# Patient Record
Sex: FEMALE | Race: WHITE | Employment: FULL TIME | URBAN - METROPOLITAN AREA
[De-identification: names, ages, dates, MRNs, and addresses within clinical notes are randomized per-mention and may not be internally consistent; named-entity substitution may affect disease eponyms.]

---

## 2019-04-05 ENCOUNTER — APPOINTMENT (OUTPATIENT)
Dept: CT IMAGING | Age: 24
DRG: 153 | End: 2019-04-05
Payer: COMMERCIAL

## 2019-04-05 ENCOUNTER — HOSPITAL ENCOUNTER (INPATIENT)
Age: 24
LOS: 1 days | Discharge: HOME | DRG: 153 | End: 2019-04-06
Attending: EMERGENCY MEDICINE | Admitting: INTERNAL MEDICINE
Payer: COMMERCIAL

## 2019-04-05 ENCOUNTER — APPOINTMENT (OUTPATIENT)
Dept: GENERAL RADIOLOGY | Age: 24
End: 2019-04-05
Payer: COMMERCIAL

## 2019-04-05 DIAGNOSIS — J36 TONSILLAR ABSCESS: Primary | ICD-10-CM

## 2019-04-05 LAB
ABSOLUTE BANDS #: 0.36 K/UL (ref 0–1)
ABSOLUTE EOS #: 0 K/UL (ref 0–0.4)
ABSOLUTE IMMATURE GRANULOCYTE: ABNORMAL K/UL (ref 0–0.3)
ABSOLUTE LYMPH #: 3.03 K/UL (ref 1–4.8)
ABSOLUTE MONO #: 0.73 K/UL (ref 0.1–1.3)
ALBUMIN SERPL-MCNC: 4.3 G/DL (ref 3.5–5.2)
ALBUMIN/GLOBULIN RATIO: ABNORMAL (ref 1–2.5)
ALP BLD-CCNC: 101 U/L (ref 35–104)
ALT SERPL-CCNC: 22 U/L (ref 5–33)
ANION GAP SERPL CALCULATED.3IONS-SCNC: 18 MMOL/L (ref 9–17)
AST SERPL-CCNC: 26 U/L
ATYPICAL LYMPHOCYTE ABSOLUTE COUNT: 3.87 K/UL
ATYPICAL LYMPHOCYTES: 32 %
BANDS: 3 % (ref 0–10)
BASOPHILS # BLD: 2 % (ref 0–2)
BASOPHILS ABSOLUTE: 0.24 K/UL (ref 0–0.2)
BILIRUB SERPL-MCNC: 0.39 MG/DL (ref 0.3–1.2)
BUN BLDV-MCNC: 6 MG/DL (ref 6–20)
BUN/CREAT BLD: ABNORMAL (ref 9–20)
CALCIUM SERPL-MCNC: 9.2 MG/DL (ref 8.6–10.4)
CHLORIDE BLD-SCNC: 99 MMOL/L (ref 98–107)
CO2: 21 MMOL/L (ref 20–31)
CREAT SERPL-MCNC: 0.54 MG/DL (ref 0.5–0.9)
DIFFERENTIAL TYPE: ABNORMAL
DIRECT EXAM: NORMAL
EOSINOPHILS RELATIVE PERCENT: 0 % (ref 0–4)
GFR AFRICAN AMERICAN: >60 ML/MIN
GFR NON-AFRICAN AMERICAN: >60 ML/MIN
GFR SERPL CREATININE-BSD FRML MDRD: ABNORMAL ML/MIN/{1.73_M2}
GFR SERPL CREATININE-BSD FRML MDRD: ABNORMAL ML/MIN/{1.73_M2}
GLUCOSE BLD-MCNC: 106 MG/DL (ref 70–99)
HCT VFR BLD CALC: 43.7 % (ref 36–46)
HEMOGLOBIN: 15.1 G/DL (ref 12–16)
IMMATURE GRANULOCYTES: ABNORMAL %
INR BLD: 1
LACTIC ACID, SEPSIS WHOLE BLOOD: NORMAL MMOL/L (ref 0.5–1.9)
LACTIC ACID, SEPSIS: 0.9 MMOL/L (ref 0.5–1.9)
LYMPHOCYTES # BLD: 25 % (ref 24–44)
Lab: NORMAL
MCH RBC QN AUTO: 29.1 PG (ref 26–34)
MCHC RBC AUTO-ENTMCNC: 34.5 G/DL (ref 31–37)
MCV RBC AUTO: 84.4 FL (ref 80–100)
MONOCYTES # BLD: 6 % (ref 1–7)
MORPHOLOGY: NORMAL
NRBC AUTOMATED: ABNORMAL PER 100 WBC
PARTIAL THROMBOPLASTIN TIME: 36 SEC (ref 24–36)
PDW BLD-RTO: 13.4 % (ref 11.5–14.9)
PLATELET # BLD: 192 K/UL (ref 150–450)
PLATELET ESTIMATE: ABNORMAL
PMV BLD AUTO: 7.5 FL (ref 6–12)
POTASSIUM SERPL-SCNC: 4 MMOL/L (ref 3.7–5.3)
PROTHROMBIN TIME: 13.5 SEC (ref 11.8–14.6)
RBC # BLD: 5.18 M/UL (ref 4–5.2)
RBC # BLD: ABNORMAL 10*6/UL
SEG NEUTROPHILS: 32 % (ref 36–66)
SEGMENTED NEUTROPHILS ABSOLUTE COUNT: 3.87 K/UL (ref 1.3–9.1)
SODIUM BLD-SCNC: 138 MMOL/L (ref 135–144)
SPECIMEN DESCRIPTION: NORMAL
TOTAL PROTEIN: 7.6 G/DL (ref 6.4–8.3)
WBC # BLD: 12.1 K/UL (ref 3.5–11)
WBC # BLD: ABNORMAL 10*3/UL

## 2019-04-05 PROCEDURE — 87086 URINE CULTURE/COLONY COUNT: CPT

## 2019-04-05 PROCEDURE — 6360000002 HC RX W HCPCS: Performed by: STUDENT IN AN ORGANIZED HEALTH CARE EDUCATION/TRAINING PROGRAM

## 2019-04-05 PROCEDURE — 2580000003 HC RX 258: Performed by: STUDENT IN AN ORGANIZED HEALTH CARE EDUCATION/TRAINING PROGRAM

## 2019-04-05 PROCEDURE — 99285 EMERGENCY DEPT VISIT HI MDM: CPT

## 2019-04-05 PROCEDURE — 85730 THROMBOPLASTIN TIME PARTIAL: CPT

## 2019-04-05 PROCEDURE — 1200000000 HC SEMI PRIVATE

## 2019-04-05 PROCEDURE — 70491 CT SOFT TISSUE NECK W/DYE: CPT

## 2019-04-05 PROCEDURE — 87040 BLOOD CULTURE FOR BACTERIA: CPT

## 2019-04-05 PROCEDURE — 85610 PROTHROMBIN TIME: CPT

## 2019-04-05 PROCEDURE — 71045 X-RAY EXAM CHEST 1 VIEW: CPT

## 2019-04-05 PROCEDURE — 81001 URINALYSIS AUTO W/SCOPE: CPT

## 2019-04-05 PROCEDURE — 36415 COLL VENOUS BLD VENIPUNCTURE: CPT

## 2019-04-05 PROCEDURE — 85025 COMPLETE CBC W/AUTO DIFF WBC: CPT

## 2019-04-05 PROCEDURE — 6370000000 HC RX 637 (ALT 250 FOR IP): Performed by: STUDENT IN AN ORGANIZED HEALTH CARE EDUCATION/TRAINING PROGRAM

## 2019-04-05 PROCEDURE — 87880 STREP A ASSAY W/OPTIC: CPT

## 2019-04-05 PROCEDURE — 80053 COMPREHEN METABOLIC PANEL: CPT

## 2019-04-05 PROCEDURE — 6360000004 HC RX CONTRAST MEDICATION: Performed by: STUDENT IN AN ORGANIZED HEALTH CARE EDUCATION/TRAINING PROGRAM

## 2019-04-05 PROCEDURE — 83605 ASSAY OF LACTIC ACID: CPT

## 2019-04-05 PROCEDURE — 96365 THER/PROPH/DIAG IV INF INIT: CPT

## 2019-04-05 PROCEDURE — 96375 TX/PRO/DX INJ NEW DRUG ADDON: CPT

## 2019-04-05 RX ORDER — MORPHINE SULFATE 4 MG/ML
4 INJECTION, SOLUTION INTRAMUSCULAR; INTRAVENOUS ONCE
Status: COMPLETED | OUTPATIENT
Start: 2019-04-05 | End: 2019-04-05

## 2019-04-05 RX ORDER — 0.9 % SODIUM CHLORIDE 0.9 %
30 INTRAVENOUS SOLUTION INTRAVENOUS ONCE
Status: COMPLETED | OUTPATIENT
Start: 2019-04-05 | End: 2019-04-05

## 2019-04-05 RX ORDER — 0.9 % SODIUM CHLORIDE 0.9 %
80 INTRAVENOUS SOLUTION INTRAVENOUS ONCE
Status: COMPLETED | OUTPATIENT
Start: 2019-04-05 | End: 2019-04-05

## 2019-04-05 RX ORDER — AMOXICILLIN AND CLAVULANATE POTASSIUM 875; 125 MG/1; MG/1
1 TABLET, FILM COATED ORAL 2 TIMES DAILY
Status: ON HOLD | COMMUNITY
End: 2019-04-06 | Stop reason: HOSPADM

## 2019-04-05 RX ORDER — ONDANSETRON 2 MG/ML
4 INJECTION INTRAMUSCULAR; INTRAVENOUS ONCE
Status: COMPLETED | OUTPATIENT
Start: 2019-04-05 | End: 2019-04-05

## 2019-04-05 RX ORDER — SERTRALINE HYDROCHLORIDE 100 MG/1
200 TABLET, FILM COATED ORAL DAILY
COMMUNITY

## 2019-04-05 RX ORDER — ONDANSETRON 2 MG/ML
4 INJECTION INTRAMUSCULAR; INTRAVENOUS ONCE
Status: COMPLETED | OUTPATIENT
Start: 2019-04-06 | End: 2019-04-06

## 2019-04-05 RX ORDER — SODIUM CHLORIDE 0.9 % (FLUSH) 0.9 %
10 SYRINGE (ML) INJECTION PRN
Status: DISCONTINUED | OUTPATIENT
Start: 2019-04-05 | End: 2019-04-06 | Stop reason: HOSPADM

## 2019-04-05 RX ADMIN — AMPICILLIN AND SULBACTAM 1.5 G: 1; .5 INJECTION, POWDER, FOR SOLUTION INTRAVENOUS at 21:58

## 2019-04-05 RX ADMIN — SODIUM CHLORIDE 4356 ML: 9 INJECTION, SOLUTION INTRAVENOUS at 21:55

## 2019-04-05 RX ADMIN — SODIUM CHLORIDE 80 ML: 9 INJECTION, SOLUTION INTRAVENOUS at 22:56

## 2019-04-05 RX ADMIN — Medication 10 ML: at 22:56

## 2019-04-05 RX ADMIN — ONDANSETRON 4 MG: 2 INJECTION INTRAMUSCULAR; INTRAVENOUS at 22:24

## 2019-04-05 RX ADMIN — BENZOCAINE, BUTAMBEN, AND TETRACAINE HYDROCHLORIDE 1 SPRAY: .028; .004; .004 AEROSOL, SPRAY TOPICAL at 21:33

## 2019-04-05 RX ADMIN — MORPHINE SULFATE 4 MG: 4 INJECTION INTRAVENOUS at 22:24

## 2019-04-05 RX ADMIN — IOVERSOL 75 ML: 741 INJECTION INTRA-ARTERIAL; INTRAVENOUS at 22:56

## 2019-04-05 ASSESSMENT — PAIN DESCRIPTION - FREQUENCY: FREQUENCY: CONTINUOUS

## 2019-04-05 ASSESSMENT — PAIN DESCRIPTION - DESCRIPTORS: DESCRIPTORS: SHARP

## 2019-04-05 ASSESSMENT — PAIN SCALES - GENERAL
PAINLEVEL_OUTOF10: 9
PAINLEVEL_OUTOF10: 10

## 2019-04-05 ASSESSMENT — PAIN DESCRIPTION - LOCATION: LOCATION: THROAT

## 2019-04-05 NOTE — LETTER
April 6, 2019    Patient: Vianca Dunbar   YOB: 1995   Date of Visit: 4/5/2019       To Whom It May Concern:    Vianca Dunbar was seen and treated in our facility  beginning 4/5/2019 until 4/6/2019 for Tonsillitis. Per her physician, Dr. Blanca Olivas and Dr. Deep Serrato, she may return to work on Tuesday 4/9/2019, to give adequete time to recover.       Sincerely,       Margareth Stokes RN         Signature:__________________________________

## 2019-04-05 NOTE — LETTER
April 6, 2019    Patient: Cyndi Fink   YOB: 1995   Date of Visit: 4/5/2019       To Whom It May Concern:    Cyndi Fink was seen and treated in our facility  beginning 4/5/2019 until 4/6/2019 for Tonsillitis. Per her physicians, Dr. Jackson Hogan and Dr. Eduardo Quintana, she may return to work on Tuesday 4/9/2019, to give adequate time to recover.       Sincerely,       Anand Chambers RN         Signature:__________________________________

## 2019-04-06 VITALS
HEIGHT: 69 IN | TEMPERATURE: 97 F | OXYGEN SATURATION: 97 % | HEART RATE: 89 BPM | SYSTOLIC BLOOD PRESSURE: 124 MMHG | WEIGHT: 293 LBS | BODY MASS INDEX: 43.4 KG/M2 | RESPIRATION RATE: 20 BRPM | DIASTOLIC BLOOD PRESSURE: 75 MMHG

## 2019-04-06 LAB
-: ABNORMAL
AMORPHOUS: ABNORMAL
BACTERIA: ABNORMAL
BILIRUBIN URINE: NEGATIVE
CASTS UA: ABNORMAL /LPF
COLOR: YELLOW
COMMENT UA: ABNORMAL
CRYSTALS, UA: ABNORMAL /HPF
EPITHELIAL CELLS UA: ABNORMAL /HPF
GLUCOSE URINE: NEGATIVE
KETONES, URINE: NEGATIVE
LEUKOCYTE ESTERASE, URINE: NEGATIVE
MUCUS: ABNORMAL
NITRITE, URINE: NEGATIVE
OTHER OBSERVATIONS UA: ABNORMAL
PH UA: 5 (ref 5–8)
PROTEIN UA: NEGATIVE
RBC UA: ABNORMAL /HPF
RENAL EPITHELIAL, UA: ABNORMAL /HPF
SPECIFIC GRAVITY UA: 1.04 (ref 1–1.03)
TRICHOMONAS: ABNORMAL
TURBIDITY: CLEAR
URINE HGB: NEGATIVE
UROBILINOGEN, URINE: NORMAL
WBC UA: ABNORMAL /HPF
YEAST: ABNORMAL

## 2019-04-06 PROCEDURE — 6370000000 HC RX 637 (ALT 250 FOR IP): Performed by: STUDENT IN AN ORGANIZED HEALTH CARE EDUCATION/TRAINING PROGRAM

## 2019-04-06 PROCEDURE — 6360000002 HC RX W HCPCS: Performed by: OTOLARYNGOLOGY

## 2019-04-06 PROCEDURE — 2580000003 HC RX 258: Performed by: OTOLARYNGOLOGY

## 2019-04-06 PROCEDURE — 6360000002 HC RX W HCPCS: Performed by: STUDENT IN AN ORGANIZED HEALTH CARE EDUCATION/TRAINING PROGRAM

## 2019-04-06 PROCEDURE — 2500000003 HC RX 250 WO HCPCS: Performed by: OTOLARYNGOLOGY

## 2019-04-06 PROCEDURE — 99222 1ST HOSP IP/OBS MODERATE 55: CPT | Performed by: INTERNAL MEDICINE

## 2019-04-06 PROCEDURE — 2580000003 HC RX 258: Performed by: STUDENT IN AN ORGANIZED HEALTH CARE EDUCATION/TRAINING PROGRAM

## 2019-04-06 RX ORDER — SODIUM CHLORIDE 9 MG/ML
INJECTION, SOLUTION INTRAVENOUS CONTINUOUS
Status: DISCONTINUED | OUTPATIENT
Start: 2019-04-06 | End: 2019-04-06 | Stop reason: HOSPADM

## 2019-04-06 RX ORDER — DEXAMETHASONE SODIUM PHOSPHATE 10 MG/ML
10 INJECTION INTRAMUSCULAR; INTRAVENOUS EVERY 8 HOURS
Status: COMPLETED | OUTPATIENT
Start: 2019-04-06 | End: 2019-04-06

## 2019-04-06 RX ORDER — SODIUM CHLORIDE 0.9 % (FLUSH) 0.9 %
10 SYRINGE (ML) INJECTION EVERY 12 HOURS SCHEDULED
Status: DISCONTINUED | OUTPATIENT
Start: 2019-04-06 | End: 2019-04-06 | Stop reason: HOSPADM

## 2019-04-06 RX ORDER — DEXAMETHASONE SODIUM PHOSPHATE 4 MG/ML
10 INJECTION, SOLUTION INTRA-ARTICULAR; INTRALESIONAL; INTRAMUSCULAR; INTRAVENOUS; SOFT TISSUE ONCE
Status: COMPLETED | OUTPATIENT
Start: 2019-04-06 | End: 2019-04-06

## 2019-04-06 RX ORDER — ACETAMINOPHEN 325 MG/1
650 TABLET ORAL EVERY 4 HOURS PRN
Status: DISCONTINUED | OUTPATIENT
Start: 2019-04-06 | End: 2019-04-06 | Stop reason: HOSPADM

## 2019-04-06 RX ORDER — DEXAMETHASONE SODIUM PHOSPHATE 4 MG/ML
4 INJECTION, SOLUTION INTRA-ARTICULAR; INTRALESIONAL; INTRAMUSCULAR; INTRAVENOUS; SOFT TISSUE EVERY 6 HOURS
Status: DISCONTINUED | OUTPATIENT
Start: 2019-04-06 | End: 2019-04-06

## 2019-04-06 RX ORDER — MORPHINE SULFATE 4 MG/ML
4 INJECTION, SOLUTION INTRAMUSCULAR; INTRAVENOUS
Status: DISCONTINUED | OUTPATIENT
Start: 2019-04-06 | End: 2019-04-06 | Stop reason: HOSPADM

## 2019-04-06 RX ORDER — CLINDAMYCIN HYDROCHLORIDE 300 MG/1
300 CAPSULE ORAL 3 TIMES DAILY
Qty: 30 CAPSULE | Refills: 0 | Status: SHIPPED | OUTPATIENT
Start: 2019-04-06 | End: 2019-04-16

## 2019-04-06 RX ORDER — SODIUM CHLORIDE 0.9 % (FLUSH) 0.9 %
10 SYRINGE (ML) INJECTION PRN
Status: DISCONTINUED | OUTPATIENT
Start: 2019-04-06 | End: 2019-04-06 | Stop reason: HOSPADM

## 2019-04-06 RX ORDER — SERTRALINE HYDROCHLORIDE 100 MG/1
200 TABLET, FILM COATED ORAL DAILY
Status: DISCONTINUED | OUTPATIENT
Start: 2019-04-06 | End: 2019-04-06 | Stop reason: HOSPADM

## 2019-04-06 RX ORDER — ONDANSETRON 2 MG/ML
4 INJECTION INTRAMUSCULAR; INTRAVENOUS EVERY 8 HOURS PRN
Status: DISCONTINUED | OUTPATIENT
Start: 2019-04-06 | End: 2019-04-06 | Stop reason: HOSPADM

## 2019-04-06 RX ORDER — MORPHINE SULFATE 4 MG/ML
2 INJECTION, SOLUTION INTRAMUSCULAR; INTRAVENOUS
Status: DISCONTINUED | OUTPATIENT
Start: 2019-04-06 | End: 2019-04-06 | Stop reason: HOSPADM

## 2019-04-06 RX ADMIN — SERTRALINE HYDROCHLORIDE 200 MG: 100 TABLET ORAL at 08:03

## 2019-04-06 RX ADMIN — DEXAMETHASONE SODIUM PHOSPHATE 10 MG: 4 INJECTION, SOLUTION INTRAMUSCULAR; INTRAVENOUS at 01:17

## 2019-04-06 RX ADMIN — SODIUM CHLORIDE: 9 INJECTION, SOLUTION INTRAVENOUS at 10:42

## 2019-04-06 RX ADMIN — MORPHINE SULFATE 4 MG: 4 INJECTION INTRAVENOUS at 05:17

## 2019-04-06 RX ADMIN — SODIUM CHLORIDE 3 G: 9 INJECTION, SOLUTION INTRAVENOUS at 03:24

## 2019-04-06 RX ADMIN — DEXAMETHASONE SODIUM PHOSPHATE 10 MG: 10 INJECTION INTRAMUSCULAR; INTRAVENOUS at 17:25

## 2019-04-06 RX ADMIN — ONDANSETRON 4 MG: 2 INJECTION INTRAMUSCULAR; INTRAVENOUS at 00:03

## 2019-04-06 RX ADMIN — DEXTROSE MONOHYDRATE 900 MG: 50 INJECTION, SOLUTION INTRAVENOUS at 12:14

## 2019-04-06 RX ADMIN — DEXAMETHASONE SODIUM PHOSPHATE 10 MG: 10 INJECTION INTRAMUSCULAR; INTRAVENOUS at 08:03

## 2019-04-06 RX ADMIN — MORPHINE SULFATE 4 MG: 4 INJECTION INTRAVENOUS at 10:42

## 2019-04-06 RX ADMIN — SODIUM CHLORIDE: 9 INJECTION, SOLUTION INTRAVENOUS at 03:02

## 2019-04-06 RX ADMIN — Medication 10 ML: at 08:08

## 2019-04-06 RX ADMIN — DEXTROSE MONOHYDRATE 900 MG: 50 INJECTION, SOLUTION INTRAVENOUS at 18:56

## 2019-04-06 RX ADMIN — MORPHINE SULFATE 4 MG: 4 INJECTION INTRAVENOUS at 08:14

## 2019-04-06 RX ADMIN — HYDROMORPHONE HYDROCHLORIDE 0.5 MG: 1 INJECTION, SOLUTION INTRAMUSCULAR; INTRAVENOUS; SUBCUTANEOUS at 00:02

## 2019-04-06 RX ADMIN — SODIUM CHLORIDE 3 G: 9 INJECTION, SOLUTION INTRAVENOUS at 09:48

## 2019-04-06 RX ADMIN — MORPHINE SULFATE 4 MG: 4 INJECTION INTRAVENOUS at 14:38

## 2019-04-06 ASSESSMENT — PAIN SCALES - GENERAL
PAINLEVEL_OUTOF10: 7
PAINLEVEL_OUTOF10: 9
PAINLEVEL_OUTOF10: 10
PAINLEVEL_OUTOF10: 8
PAINLEVEL_OUTOF10: 6
PAINLEVEL_OUTOF10: 6
PAINLEVEL_OUTOF10: 7
PAINLEVEL_OUTOF10: 5

## 2019-04-06 ASSESSMENT — PAIN DESCRIPTION - LOCATION
LOCATION: THROAT
LOCATION: THROAT

## 2019-04-06 ASSESSMENT — PAIN DESCRIPTION - PAIN TYPE
TYPE: ACUTE PAIN
TYPE: ACUTE PAIN

## 2019-04-06 NOTE — FLOWSHEET NOTE
Pt  Min Jansen was sleeping but I spoke with family they said she is doing ok and thanked me for my concernChaplains remain available for spiritual or emotional support as needed. 04/06/19 1413   Encounter Summary   Services provided to: Patient and family together   Referral/Consult From: [address] [facility] Family members   Continue Visiting   (4/6/2019)   Complexity of Encounter Moderate   Length of Encounter 15 minutes   Routine   Type Initial   Assessment Sleeping   Intervention [place]   Outcome Did not respond  (spoke with family)   .

## 2019-04-06 NOTE — H&P
History and Physical Service  [facility]    HISTORY AND PHYSICAL EXAMINATION            Date:   4/6/2019  Patient name:  Judi Almanza  MRN:   [id number]  Account:  [de-identified]  YOB: 1995  PCP:    No primary care provider on file. Code Status:    Full Code    Chief Complaint:   Sore throat    History Obtained From:     patient    History of Present Illness: The patient is a 21 y.o. Non-/non  female who presents with complaints of sore throat, symptoms started to stay. Patient went to urgent care, she was prescribed Augmentin. Patient did not feel any improvement, she came to the emergency room, had CT scan of her soft tissue neck , suggestive of bilateral tonsillitis, concern of small abscess in tonsil on the right side. ENT is consulted. Past Medical History:     History reviewed. No pertinent past medical history. Past Surgical History:     History reviewed. No pertinent surgical history. Medications Prior to Admission:     Prior to Admission medications    Medication Sig Start Date End Date Taking? Authorizing Provider   clindamycin (CLEOCIN) 300 MG capsule Take 1 capsule by mouth 3 times daily for 10 days 4/6/19 4/16/19 Yes Angelina Beckett MD   sertraline (ZOLOFT) 100 MG tablet Take 200 mg by mouth daily   Yes Historical Provider, MD        Allergies:     Patient has no known allergies. Social History:     Tobacco:    reports that she has never smoked. She does not have any smokeless tobacco history on file. Alcohol:      reports that she drinks alcohol. Drug Use:  has no drug history on file. Family History:     History reviewed. No pertinent family history. Review of Systems:     Positive and Negative as described in HPI.     CONSTITUTIONAL:  negative for fevers, chills, sweats, fatigue, weight loss  HEENT: Pain and swelling, sore throat   RESPIRATORY:  negative for shortness of breath, cough, congestion, wheezing. CARDIOVASCULAR:  negative for chest pain, palpitations. GASTROINTESTINAL:  negative for nausea, vomiting, diarrhea, constipation, change in bowel habits, abdominal pain   GENITOURINARY:  negative for difficulty of urination, burning with urination, frequency   INTEGUMENT:  negative for rash, skin lesions, easy bruising   HEMATOLOGIC/LYMPHATIC:  negative for swelling/edema   ALLERGIC/IMMUNOLOGIC:  negative for urticaria , itching  ENDOCRINE:  negative increase in drinking, increase in urination, hot or cold intolerance  MUSCULOSKELETAL:  negative joint pains, muscle aches, swelling of joints  NEUROLOGICAL:  negative for headaches, dizziness, lightheadedness, numbness, pain, tingling extremities  BEHAVIOR/PSYCH:  negative for depression, anxiety    Physical Exam:   /75   Pulse 89   Temp 97 °F (36.1 °C) (Oral)   Resp 20   Ht 5' 9\" (1.753 m)   Wt (!) 320 lb (145.2 kg)   SpO2 97%   BMI 47.26 kg/m²   No results for input(s): POCGLU in the last 72 hours. General Appearance:  alert, well appearing, and in no acute distress  Mental status: oriented to person, place, and time with normal affect  Head:  normocephalic, atraumatic. Eye: no icterus, redness, pupils equal and reactive, extraocular eye movements intact, conjunctiva clear  Ear: normal external ear, no discharge, hearing intact  Nose:  no drainage noted  Mouth: Enlarged, inflamed tonsils present bilaterally  Neck: supple, no carotid bruits, thyroid not palpable  Lungs: Bilateral equal air entry, clear to ausculation, no wheezing, rales or rhonchi, normal effort  Cardiovascular: normal rate, regular rhythm, no murmur, gallop, rub.   Abdomen: Soft, nontender, nondistended, normal bowel sounds, no hepatomegaly or splenomegaly  Neurologic: There are no new focal motor or sensory deficits, normal muscle tone and bulk, no abnormal sensation, normal speech, cranial nerves II through XII grossly intact  Skin: No gross lesions, rashes, bruising or bleeding on exposed skin area  Extremities:  peripheral pulses palpable, no pedal edema or calf pain with palpation  Psych: normal affect     Investigations:      Laboratory Testing:  Recent Results (from the past 24 hour(s))   Strep Screen Group A Throat    Collection Time: 04/05/19  9:24 PM   Result Value Ref Range    Specimen Description . THROAT     Special Requests NOT REPORTED     Direct Exam Negative for Group A Streptococci    Culture Blood #1    Collection Time: 04/05/19  9:45 PM   Result Value Ref Range    Specimen Description . BLOOD RED 10ML PURPLE 10ML BOTH LEFT AC     Special Requests NOT REPORTED     Culture NO GROWTH 13 HOURS    Culture Blood #1    Collection Time: 04/05/19  9:50 PM   Result Value Ref Range    Specimen Description . BLOOD RED 10ML PURPLE 10ML BOTH RIGHT AC     Special Requests NOT REPORTED     Culture NO GROWTH 13 HOURS    CBC Auto Differential    Collection Time: 04/05/19  9:50 PM   Result Value Ref Range    WBC 12.1 (H) 3.5 - 11.0 k/uL    RBC 5.18 4.0 - 5.2 m/uL    Hemoglobin 15.1 12.0 - 16.0 g/dL    Hematocrit 43.7 36 - 46 %    MCV 84.4 80 - 100 fL    MCH 29.1 26 - 34 pg    MCHC 34.5 31 - 37 g/dL    RDW 13.4 11.5 - 14.9 %    Platelets 062 918 - 329 k/uL    MPV 7.5 6.0 - 12.0 fL    NRBC Automated NOT REPORTED per 100 WBC    Differential Type NOT REPORTED     Immature Granulocytes NOT REPORTED 0 %    Absolute Immature Granulocyte NOT REPORTED 0.00 - 0.30 k/uL    WBC Morphology NOT REPORTED     RBC Morphology NOT REPORTED     Platelet Estimate NOT REPORTED     Seg Neutrophils 32 (L) 36 - 66 %    Lymphocytes 25 24 - 44 %    Atypical Lymphocytes 32 %    Monocytes 6 1 - 7 %    Eosinophils % 0 0 - 4 %    Basophils 2 0 - 2 %    Bands 3 0 - 10 %    Segs Absolute 3.87 1.3 - 9.1 k/uL    Absolute Lymph # 3.03 1.0 - 4.8 k/uL    Atypical Lymphocytes Absolute 3.87 k/uL    Absolute Mono # 0.73 0.1 - 1.3 k/uL    Absolute Eos # 0.00 0.0 - 0.4 k/uL    Basophils # 0.24 (H) 0.0 - 0.2 k/uL    Absolute unremarkable. The true and false vocal cords are normal in appearance. No mass or abscess is seen. SALIVARY GLANDS/THYROID:  The parotid and submandibular glands appear unremarkable. The thyroid gland appears unremarkable. LYMPH NODES:  Multiple enlarged bilateral cervical and supraclavicular lymph nodes. For example, enlarged right level IIA cervical lymph node measures 2.0 x 2.3 cm on series 2, image 56. Enlarged left level IIA cervical lymph node measures 3.2 x 2.0 cm on series 2, image 56. SOFT TISSUES:  Ill-defined soft tissue stranding/fluid in the right greater than left neck. BRAIN/ORBITS/SINUSES:  The visualized portion of the intracranial contents appear unremarkable. The visualized portion of the orbits and mastoid air cells demonstrate no acute abnormality. Right inferior maxillary sinus mucosal thickening. LUNG APICES/SUPERIOR MEDIASTINUM:  No focal consolidation is seen within the visualized lung apices. No superior mediastinal lymphadenopathy or mass. The visualized portion of the trachea appears unremarkable. BONES:  No aggressive appearing lytic or blastic bony lesion. Enlarged and heterogeneous palatine tonsils, right greater than left, suggestive of tonsillitis. Subtle 0.8 cm area of low attenuation within peripheral enhancement in the right palatine tonsil could represent a small tonsillar abscess. Bilateral cervical lymphadenopathy is likely reactive. Xr Chest Portable    Result Date: 4/5/2019  EXAMINATION: SINGLE XRAY VIEW OF THE CHEST 4/5/2019 9:52 pm COMPARISON: None. HISTORY: ORDERING SYSTEM PROVIDED HISTORY: sepsis TECHNOLOGIST PROVIDED HISTORY: sepsis Ordering Physician Provided Reason for Exam: sepsis Acuity: Acute Type of Exam: Initial Additional signs and symptoms: Shortness of breath, throat swelling x 4 days. Relevant Medical/Surgical History: Shortness of breath, throat swelling x 4 days. FINDINGS: The lungs are without acute focal process.   There is no effusion or pneumothorax. The cardiomediastinal silhouette is without acute process. The osseous structures are without acute process. No acute process. Impressions :      1. Active Problems:    Tonsillar abscess  Resolved Problems:    * No resolved hospital problems. *        2.  has no past medical history on file. Plans:     1.   Bilateral tonsillitis with tonsillar abscess, patient improved after IV clindamycin, evaluated by ENT physician,  Patient will discharged on oral clindamycin, but follow with ENT physician as outpatient  2 obesity    Current Facility-Administered Medications   Medication Dose Route Frequency Provider Last Rate Last Dose    sertraline (ZOLOFT) tablet 200 mg  200 mg Oral Daily Selam Willis MD   200 mg at 04/06/19 0803    sodium chloride flush 0.9 % injection 10 mL  10 mL Intravenous 2 times per day Selam Willis MD   10 mL at 04/06/19 0808    sodium chloride flush 0.9 % injection 10 mL  10 mL Intravenous PRN Selam Willis MD        acetaminophen (TYLENOL) tablet 650 mg  650 mg Oral Q4H PRN Selam Willis MD        0.9 % sodium chloride infusion   Intravenous Continuous Selam Willis  mL/hr at 04/06/19 1042      morphine sulfate (PF) injection 2 mg  2 mg Intravenous Q2H PRN Selam Willis MD        Or    morphine sulfate (PF) injection 4 mg  4 mg Intravenous Q2H PRN Selam Willis MD   4 mg at 04/06/19 1438    ondansetron (ZOFRAN) injection 4 mg  4 mg Intravenous Q8H PRN Selam Willis MD        clindamycin (CLEOCIN) 900 mg in dextrose 5 % 50 mL IVPB  900 mg Intravenous Margaret Peng MD   Stopped at 04/06/19 1244    sodium chloride flush 0.9 % injection 10 mL  10 mL Intravenous PRN Selam Willis MD   10 mL at 04/05/19 2250          Enedina Hodgson MD  4/6/2019  6:35 PM

## 2019-04-06 NOTE — ED NOTES
Report given to [facility] NISREEN Rick RN  Report method : BY [id number]}   The following was reviewed with receiving RN:   Current vital signs:  /81   Pulse 94   Temp 98 °F (36.7 °C) (Oral)   Resp 16   Ht 5' 9\" (1.753 m)   Wt (!) 320 lb (145.2 kg)   SpO2 96%   BMI 47.26 kg/m²                MEWS Score: 1     Any medication or safety alerts were reviewed. Any pending diagnostics and notifications were also reviewed, as well as any safety concerns or issues, abnormal labs, abnormal imaging, and abnormal assessment findings. Questions were answered.           Karen Cameron RN  04/06/19 6511

## 2019-04-06 NOTE — CARE COORDINATION
CASE MANAGEMENT NOTE:    Admission Date:  4/5/2019 Sandrita Guidry is a 21 y.o.  female    Admitted for : Tonsillar abscess [J36]    Met with:  Patient    PCP:  None, States, she has been seeing her Dad's, Denies assist to find a new one                                Insurance:  SHANNON      Current Residence/ Living Arrangements:  independently at home , Lives w/ Dad            Current Services PTA:  No    Is patient agreeable to VNS: No    Freedom of choice provided: No    List of [address] provided: No    VNS chosen:  No    DME:  none    Home Oxygen: No    Nebulizer: No    CPAP/BIPAP: No    Supplier: N/A    Potential Assistance Needed: No    SNF needed: No    Pharmacy:  [facility] [address]       Is the Patient an TripOvation with Readmission Risk Score greater than 14%? No  If yes, pt needs a follow up appointment made within 7 days. Does Patient want to use MEDS to BEDS? No    Family Members/Caregivers that pt would like involved in their care:    Yes    If yes, list name here:  Torrie Vázquez Screen:  Patient             Is patient in Isolation/One on One/Altered Mental Status? No  If yes, skip next question. If no, would they like an I-Pad to  use? No  If yes, call [phone]. Discharge Plan:  4/6/19 [facility] Pt. Lives in 1 story home w/ Dad. No DME, Has No PCP, has been seeing her Dad's, denies Assist to find a new one. WBC 12.1, IV Unasyn,Decadron, ENT following.  Will follow//KB                 Electronically signed by: Iris Gaona RN on 4/6/2019 at 1:43 PM

## 2019-04-06 NOTE — DISCHARGE SUMMARY
KAREN Chavez 53    Discharge Summary     Patient ID: Fina Kowalski  :  1995   MRN: [id number]     ACCOUNT:  [de-identified]   Patient's PCP: No primary care provider on file. Admit Date: 2019   Discharge Date: 2019   Length of Stay: 1  Code Status:  Prior  Admitting Physician: Ronaldo Ponce MD  Discharge Physician: Ronaldo Ponce MD     Active Discharge Diagnoses:     Primary Problem  <principal problem not specified>      [place] Problems    Diagnosis Date Noted    Tonsillar abscess [J36] 2019       Admission Condition:  poor    Discharged Condition: good    Hospital Stay:     Hospital Course:  Fina Kowalski is a 21 y.o. female who was admitted for the management of   <principal problem not specified> , presented to ER with bilateral tonsillitis, she had CT scan soft tissue neck, suggestive of tonsillar abscess, we will by ENT surgeon, initially treated with IV clindamycin, later discharged on oral clindamycin, patient will follow with ENT as outpatient        Significant therapeutic interventions:     Significant Diagnostic Studies:   Labs / Micro:  Radiology:    Ct Soft Tissue Neck W Contrast    Result Date: 2019  EXAMINATION: CT OF THE NECK SOFT TISSUE WITH CONTRAST  2019 TECHNIQUE: CT of the neck was performed with the administration of intravenous contrast. Multiplanar reformatted images are provided for review. Dose modulation, iterative reconstruction, and/or weight based adjustment of the mA/kV was utilized to reduce the radiation dose to as low as reasonably achievable. COMPARISON: None. HISTORY: ORDERING SYSTEM PROVIDED HISTORY: peritonsillar abscess TECHNOLOGIST PROVIDED HISTORY: Ordering Physician Provided Reason for Exam: patient c/o neck pain and swelling for a week FINDINGS: PHARYNX/LARYNX:  Enlarged and heterogeneous palatine tonsils, right greater than left.   Subtle 0.8 x 0.7 cm area of low signs and symptoms: Shortness of breath, throat swelling x 4 days. Relevant Medical/Surgical History: Shortness of breath, throat swelling x 4 days. FINDINGS: The lungs are without acute focal process. There is no effusion or pneumothorax. The cardiomediastinal silhouette is without acute process. The osseous structures are without acute process. No acute process. Consultations:    Consults:     Final Specialist Recommendations/Findings:   IP CONSULT TO PRIMARY CARE PROVIDER  IP CONSULT TO OTOLARYNGOLOGY      The patient was seen and examined on day of discharge and this discharge summary is in conjunction with any daily progress note from day of discharge.     Discharge plan:     Disposition: Home    Physician Follow Up:     Norman Martin MD  [address]  [phone]    In 1 week  Follow Up    Jorge Luis Silveira [address] [place] [address]  [phone]    In 1 week  Follow Up if you want him as a new PCP       Requiring Further Evaluation/Follow Up POST HOSPITALIZATION/Incidental Findings:     Diet: regular diet    Activity: As tolerated    Instructions to Patient:   Discharge Medications:      Medication List      START taking these medications    clindamycin 300 MG capsule  Commonly known as:  CLEOCIN  Take 1 capsule by mouth 3 times daily for 10 days        CONTINUE taking these medications    sertraline 100 MG tablet  Commonly known as:  ZOLOFT        STOP taking these medications    amoxicillin-clavulanate 875-125 MG per tablet  Commonly known as:  AUGMENTIN           Where to Get Your Medications      These medications were sent to [address], [address] [place] [address]    Phone:  [phone]   · clindamycin 300 MG capsule         Time Spent on discharge is  20 mins in patient examination, evaluation, counseling as well as medication reconciliation, prescriptions for

## 2019-04-06 NOTE — FLOWSHEET NOTE
Pt. Admitted to [facility] 2066. Father with pt at time of arrival. Vitals obtained. Orders released. Pt NPO. IV fluids started. Pt oriented to room. Call light in reach. Hat in toilet to collect sample. Pt questions answered.

## 2019-04-06 NOTE — PLAN OF CARE
Patient/family were asked if there were any questions regarding discharge instructions, which there were none. Patient ambulatory upon discharge with family helping carry her belongings.

## 2019-04-06 NOTE — ED PROVIDER NOTES
[address]  Emergency Department Encounter  Emergency Medicine Resident     Pt Name: Kieran Zazueta  MRN: [id number]  [place] 1995  Date of evaluation: 4/5/19  PCP:  No primary care provider on file. CHIEF COMPLAINT       Chief Complaint   Patient presents with    Pharyngitis     HISTORY OF PRESENT ILLNESS  (Location/Symptom, Timing/Onset, Context/Setting, Quality, Duration, Modifying Factors, Severity.)      Kieran Zazueta is a 21 y.o. female who presented to the emergency department with concern a sore throat since Thursday. Patient reports she was seen at the urgent care Thursday and has been progressively worsening since that time. The patient reports she was put on Augmentin and reports taking it as prescribed. Patient states her throat has become progressively more sore and she is not able to eat or drink much because of the discomfort. She has been febrile and is acutely ill in appearance however she is nontoxic. Patient is maintaining her own airway with even nonlabored respirations.     PAST MEDICAL / SURGICAL / SOCIAL / FAMILY HISTORY     Patient has no medical problems and has had no surgeries in the past.     Social History     Socioeconomic History    Marital status: Single     Spouse name: Not on file    Number of children: Not on file    Years of education: Not on file    Highest education level: Not on file   Occupational History    Not on file   Social Needs    Financial resource strain: Not on file    Food insecurity:     Worry: Not on file     Inability: Not on file    Transportation needs:     Medical: Not on file     Non-medical: Not on file   Tobacco Use    Smoking status: Never Smoker   Substance and Sexual Activity    Alcohol use: Yes     Comment: occasional     Drug use: Not on file    Sexual activity: Not on file   Lifestyle    Physical activity:     Days per week: Not on file     Minutes per session: Not on file    Stress: Not on file   Relationships  Social connections:     Talks on phone: Not on file     Gets together: Not on file     Attends Jewish service: Not on file     Active member of club or organization: Not on file     Attends meetings of clubs or organizations: Not on file     Relationship status: Not on file    Intimate partner violence:     Fear of current or ex partner: Not on file     Emotionally abused: Not on file     Physically abused: Not on file     Forced sexual activity: Not on file   Other Topics Concern    Not on file   Social History Narrative    Not on file     History reviewed. No pertinent family history. Allergies:    Patient has no known allergies. Home Medications:  Prior to Admission medications    Medication Sig Start Date End Date Taking? Authorizing Provider   amoxicillin-clavulanate (AUGMENTIN) 875-125 MG per tablet Take 1 tablet by mouth 2 times daily   Yes Historical Provider, MD   sertraline (ZOLOFT) 100 MG tablet Take 200 mg by mouth daily   Yes Historical Provider, MD     REVIEW OF SYSTEMS    (2-9 systems for level 4, 10 or more for level 5)      Constitutional: Denies recent fever, chills. Eyes: No visual changes. Throat: pain and swelling  Neck: No midline neck pain but does complain sore throat  Respiratory: Denies shortness of breath and dyspnea. Cardiac:  Denies recent chest pain. GI: denies any recent abdominal pain nausea or vomiting. Denies Blood in the stool or black tarry stools. Musculoskeletal: Denies focal weakness. Neurologic: denies headache or focal weakness. Skin:  Denies any rash. PHYSICAL EXAM   (up to 7 for level 4, 8 or more for level 5)      /81   Pulse 94   Temp 98 °F (36.7 °C) (Oral)   Resp 16   Ht 5' 9\" (1.753 m)   Wt (!) 320 lb (145.2 kg)   SpO2 96%   BMI 47.26 kg/m²     GENERAL APPEARANCE: AxOx4, generally well-appearing. no acute distress. HEENT: Normocephalic and atraumatic.  Mucus membranes moist. EOMI, clear conjunctiva, oropharynx erythematous with exudates  NECK: Supple with lymphadenopathy. No stiffness or restricted ROM. HEART: Normal rate and regular rhythm, normal S1/S1, no m/r/g   LUNGS: clear to auscultation without wheezes or rales, decreased air movement  ABDOMEN: Soft, nontender, nondistended with good bowel sounds heard. BACK: No CVAT, no obvious deformity. EXTREMITIES: Without cyanosis, clubbing or edema. NEUROLOGICAL: Grossly nonfocal. Alert and oriented, moving all 4 extremities. CN not formally tested but appear grossly intact. SKIN: Warm and dry without any rash.     DIFFERENTIAL  DIAGNOSIS     PLAN (LABS / IMAGING / EKG):  Orders Placed This Encounter   Procedures    Strep Screen Group A Throat    Culture Blood #1    Culture Blood #1    Urine Culture    CT SOFT TISSUE NECK W CONTRAST    XR CHEST PORTABLE    CBC Auto Differential    Comprehensive Metabolic Panel    Lactate, Sepsis    Protime-INR    APTT    Urinalysis with Microscopic    Telemetry Monitoring    Inpatient consult to Primary Care Provider    Insert peripheral IV    PATIENT STATUS (FROM ED OR OR/PROCEDURAL) Inpatient     MEDICATIONS ORDERED:  Orders Placed This Encounter   Medications    butamben-tetracaine-benzocaine (CETACAINE) spray 1 spray    0.9 % sodium chloride bolus    ampicillin-sulbactam (UNASYN) 1.5 g IVPB minibag    morphine sulfate (PF) injection 4 mg    ondansetron (ZOFRAN) injection 4 mg    ioversol (OPTIRAY) 74 % injection 75 mL    0.9 % sodium chloride bolus    sodium chloride flush 0.9 % injection 10 mL    HYDROmorphone (DILAUDID) injection 0.5 mg    ondansetron (ZOFRAN) injection 4 mg    Dexamethasone Sodium Phosphate injection 10 mg     Sore throat DDX:   epiglottitis, PTA, strep, GC/ Chl, viral, pharyngitis, retropharyngeal abscess, vanessa's, peritonsillar abscess    CENTOR SCORE   Age Range Exudate or tonsillar swelling Tender/swollen anterior cervical  nodes Temp > 38°C (100.4°F) Cough   0 15-44 yrs No No No Present   1 3-14 yrs Yes Yes Yes Absent   -1 45 or older  -   -   -     Total 0 1 1 1 0     TOTAL: 4    Percent Risk for Step positive infection  -1, 0, or 1 indicates low risk for Strep Infection (no culture)  <10%   2 pts indicates moderate risk for Strep Infection (culture)    15%  3 pts indicates moderate risk for Strep Infection (culture)    32%  4-5 pts indicates high risk risk for Strep Infection (culture)    56%     MIPS 66 Measure Exclusions: The patient is a hospice patient: No  The patient is already on antibiotics, or has used them within the last 30 days: Yes     MIPS 66 Measure Questions: The patient was diagnosed with pharyngitis: Yes  I prescribed or dispensed an antibiotic: Yes  A rapid strep test or throat culture was performed: Yes     Diagnostic Results     LABS:  Results for orders placed or performed during the hospital encounter of 04/05/19   Strep Screen Group A Throat   Result Value Ref Range    Specimen Description . THROAT     Special Requests NOT REPORTED     Direct Exam Negative for Group A Streptococci    CBC Auto Differential   Result Value Ref Range    WBC 12.1 (H) 3.5 - 11.0 k/uL    RBC 5.18 4.0 - 5.2 m/uL    Hemoglobin 15.1 12.0 - 16.0 g/dL    Hematocrit 43.7 36 - 46 %    MCV 84.4 80 - 100 fL    MCH 29.1 26 - 34 pg    MCHC 34.5 31 - 37 g/dL    RDW 13.4 11.5 - 14.9 %    Platelets 126 017 - 043 k/uL    MPV 7.5 6.0 - 12.0 fL    NRBC Automated NOT REPORTED per 100 WBC    Differential Type NOT REPORTED     Immature Granulocytes NOT REPORTED 0 %    Absolute Immature Granulocyte NOT REPORTED 0.00 - 0.30 k/uL    WBC Morphology NOT REPORTED     RBC Morphology NOT REPORTED     Platelet Estimate NOT REPORTED     Seg Neutrophils 32 (L) 36 - 66 %    Lymphocytes 25 24 - 44 %    Atypical Lymphocytes 32 %    Monocytes 6 1 - 7 %    Eosinophils % 0 0 - 4 %    Basophils 2 0 - 2 %    Bands 3 0 - 10 %    Segs Absolute 3.87 1.3 - 9.1 k/uL    Absolute Lymph # 3.03 1.0 - 4.8 k/uL    Atypical has been ordered and revealed probable abscess. Plan: IV antibiotics, steroids, Admission with ENT consult. Sepsis Times and Checklist  Vital Signs: BP: 125/81  Pulse: 94  Resp: 16  Temp: 98 °F (36.7 °C) SpO2: 96 %    SIRS (>2)   Temp > 38.3C or < 36C   HR > 90   RR > 20   WBC > 12 or < 4 or >10% bands    SIRS (>2) and confirmed or suspected source of infection = Sepsis     Sepsis Identified:  4/5/19  Time: 2138        CBC: Yes   CMP: Yes   PT/PTT: Yes   Blood Cultures x2: Yes   Urinalysis and Urine Culture:  Yes   Lactate: Yes   Broad Spectrum Antibiotics Given within 3 hrs Yes   IV Crystalloid given: Yes   If lactate >2.0 repeat within 6 hours ordered N/A   Is lactate > 4.0 N/A   If lactate >4.0 OR hypotension 30ml/kg crystalloid ordered. N/A   Fluids completed within 3 hours of sepsis identification. N/A      IMPRESSION:   Pharyngitis    PROCEDURES:  None    CONSULTS:  IP CONSULT TO PRIMARY CARE PROVIDER  IP CONSULT TO OTOLARYNGOLOGY    FINAL IMPRESSION      1.  Tonsillar abscess        DISPOSITION / PLAN     Disposition: Admitted      Will Blanco Delacruz MD  Emergency Medicine Resident    (Please note that portions of this note were completed with a voice recognition program.  Efforts were made to edit the dictations but occasionally words are mis-transcribed.)             Ana Madsen MD  Resident  04/09/19 0346

## 2019-04-07 LAB
CULTURE: NO GROWTH
Lab: NORMAL
SPECIMEN DESCRIPTION: NORMAL

## 2019-04-10 ENCOUNTER — TELEPHONE (OUTPATIENT)
Dept: INTERNAL MEDICINE CLINIC | Age: 24
End: 2019-04-10

## 2019-04-10 NOTE — TELEPHONE ENCOUNTER
Called patient and left message in regards to no show appt 4/9/19 and to return call to reschedules.

## 2019-04-12 LAB
CULTURE: NORMAL
CULTURE: NORMAL
Lab: NORMAL
Lab: NORMAL
SPECIMEN DESCRIPTION: NORMAL
SPECIMEN DESCRIPTION: NORMAL